# Patient Record
Sex: MALE | Race: WHITE | NOT HISPANIC OR LATINO | Employment: OTHER | URBAN - METROPOLITAN AREA
[De-identification: names, ages, dates, MRNs, and addresses within clinical notes are randomized per-mention and may not be internally consistent; named-entity substitution may affect disease eponyms.]

---

## 2024-07-14 ENCOUNTER — HOSPITAL ENCOUNTER (EMERGENCY)
Facility: HOSPITAL | Age: 69
Discharge: HOME | End: 2024-07-14
Attending: EMERGENCY MEDICINE
Payer: MEDICARE

## 2024-07-14 VITALS
OXYGEN SATURATION: 96 % | DIASTOLIC BLOOD PRESSURE: 93 MMHG | WEIGHT: 165 LBS | HEART RATE: 80 BPM | RESPIRATION RATE: 19 BRPM | TEMPERATURE: 98 F | BODY MASS INDEX: 23.1 KG/M2 | HEIGHT: 71 IN | SYSTOLIC BLOOD PRESSURE: 178 MMHG
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
## 2024-07-14 DIAGNOSIS — I10 CHRONIC HYPERTENSION: ICD-10-CM

## 2024-07-14 DIAGNOSIS — S61.250A OPEN WOUND OF RIGHT INDEX FINGER DUE TO DOG BITE: Primary | ICD-10-CM

## 2024-07-14 DIAGNOSIS — S61.452D DOG BITE OF LEFT HAND, SUBSEQUENT ENCOUNTER: ICD-10-CM

## 2024-07-14 LAB
ALBUMIN SERPL-MCNC: 4 G/DL (ref 3.4–4.8)
ALBUMIN/GLOB SERPL: 1.7 RATIO (ref 1.1–2)
ALP SERPL-CCNC: 75 UNIT/L (ref 40–150)
ALT SERPL-CCNC: 13 UNIT/L (ref 0–55)
ANION GAP SERPL CALC-SCNC: 11 MEQ/L
AST SERPL-CCNC: 17 UNIT/L (ref 5–34)
BASOPHILS # BLD AUTO: 0.03 X10(3)/MCL
BASOPHILS NFR BLD AUTO: 0.5 %
BILIRUB SERPL-MCNC: 1.4 MG/DL
BUN SERPL-MCNC: 18.8 MG/DL (ref 8.4–25.7)
CALCIUM SERPL-MCNC: 9.4 MG/DL (ref 8.8–10)
CHLORIDE SERPL-SCNC: 108 MMOL/L (ref 98–107)
CO2 SERPL-SCNC: 23 MMOL/L (ref 23–31)
CREAT SERPL-MCNC: 0.84 MG/DL (ref 0.73–1.18)
CREAT/UREA NIT SERPL: 22
EOSINOPHIL # BLD AUTO: 0.02 X10(3)/MCL (ref 0–0.9)
EOSINOPHIL NFR BLD AUTO: 0.3 %
ERYTHROCYTE [DISTWIDTH] IN BLOOD BY AUTOMATED COUNT: 12.6 % (ref 11.5–17)
GFR SERPLBLD CREATININE-BSD FMLA CKD-EPI: >60 ML/MIN/1.73/M2
GLOBULIN SER-MCNC: 2.4 GM/DL (ref 2.4–3.5)
GLUCOSE SERPL-MCNC: 85 MG/DL (ref 82–115)
HCT VFR BLD AUTO: 43.5 % (ref 42–52)
HGB BLD-MCNC: 14.3 G/DL (ref 14–18)
IMM GRANULOCYTES # BLD AUTO: 0.01 X10(3)/MCL (ref 0–0.04)
IMM GRANULOCYTES NFR BLD AUTO: 0.2 %
LYMPHOCYTES # BLD AUTO: 1.29 X10(3)/MCL (ref 0.6–4.6)
LYMPHOCYTES NFR BLD AUTO: 20.5 %
MCH RBC QN AUTO: 31.2 PG (ref 27–31)
MCHC RBC AUTO-ENTMCNC: 32.9 G/DL (ref 33–36)
MCV RBC AUTO: 94.8 FL (ref 80–94)
MONOCYTES # BLD AUTO: 0.63 X10(3)/MCL (ref 0.1–1.3)
MONOCYTES NFR BLD AUTO: 10 %
NEUTROPHILS # BLD AUTO: 4.3 X10(3)/MCL (ref 2.1–9.2)
NEUTROPHILS NFR BLD AUTO: 68.5 %
NRBC BLD AUTO-RTO: 0 %
PLATELET # BLD AUTO: 281 X10(3)/MCL (ref 130–400)
PMV BLD AUTO: 9.2 FL (ref 7.4–10.4)
POTASSIUM SERPL-SCNC: 4 MMOL/L (ref 3.5–5.1)
PROT SERPL-MCNC: 6.4 GM/DL (ref 5.8–7.6)
RBC # BLD AUTO: 4.59 X10(6)/MCL (ref 4.7–6.1)
SODIUM SERPL-SCNC: 142 MMOL/L (ref 136–145)
WBC # BLD AUTO: 6.28 X10(3)/MCL (ref 4.5–11.5)

## 2024-07-14 PROCEDURE — 96365 THER/PROPH/DIAG IV INF INIT: CPT

## 2024-07-14 PROCEDURE — 80053 COMPREHEN METABOLIC PANEL: CPT | Performed by: PHYSICIAN ASSISTANT

## 2024-07-14 PROCEDURE — 85025 COMPLETE CBC W/AUTO DIFF WBC: CPT | Performed by: PHYSICIAN ASSISTANT

## 2024-07-14 PROCEDURE — 99284 EMERGENCY DEPT VISIT MOD MDM: CPT | Mod: 25

## 2024-07-14 PROCEDURE — 63600175 PHARM REV CODE 636 W HCPCS: Performed by: PHYSICIAN ASSISTANT

## 2024-07-14 PROCEDURE — 87040 BLOOD CULTURE FOR BACTERIA: CPT | Performed by: PHYSICIAN ASSISTANT

## 2024-07-14 RX ORDER — AMOXICILLIN AND CLAVULANATE POTASSIUM 875; 125 MG/1; MG/1
1 TABLET, FILM COATED ORAL 2 TIMES DAILY
Qty: 20 TABLET | Refills: 0 | Status: SHIPPED | OUTPATIENT
Start: 2024-07-14 | End: 2024-07-24

## 2024-07-14 RX ORDER — IBUPROFEN 600 MG/1
600 TABLET ORAL EVERY 8 HOURS PRN
Qty: 30 TABLET | Refills: 0 | Status: SHIPPED | OUTPATIENT
Start: 2024-07-14 | End: 2024-07-24

## 2024-07-14 RX ORDER — HYDROCODONE BITARTRATE AND ACETAMINOPHEN 5; 325 MG/1; MG/1
1 TABLET ORAL EVERY 6 HOURS PRN
Qty: 12 TABLET | Refills: 0 | Status: SHIPPED | OUTPATIENT
Start: 2024-07-14 | End: 2024-07-19

## 2024-07-14 RX ORDER — CEFAZOLIN SODIUM 2 G/50ML
2 SOLUTION INTRAVENOUS
Status: COMPLETED | OUTPATIENT
Start: 2024-07-14 | End: 2024-07-14

## 2024-07-14 RX ORDER — BACITRACIN ZINC 500 UNIT/G
OINTMENT (GRAM) TOPICAL 2 TIMES DAILY
Qty: 30 G | Refills: 0 | Status: SHIPPED | OUTPATIENT
Start: 2024-07-14 | End: 2024-07-21

## 2024-07-14 RX ADMIN — CEFAZOLIN SODIUM 2 G: 2 SOLUTION INTRAVENOUS at 12:07

## 2024-07-14 NOTE — FIRST PROVIDER EVALUATION
Medical screening examination initiated.  I have conducted a focused provider triage encounter, findings are as follows:    Brief history of present illness:  68yoM with right hand pain after dog bite yesterday afternoon. Evaluated and treated at local ER and discharged. Updated tetanus and given abx. No fracture noted. No ligament/tendon damage.     There were no vitals filed for this visit.    Pertinent physical exam:  NAD. Ambulatory.     Brief workup plan:  labs, imagnig    Preliminary workup initiated; this workup will be continued and followed by the physician or advanced practice provider that is assigned to the patient when roomed.  
done

## 2024-07-14 NOTE — ED PROVIDER NOTES
"Encounter Date: 7/14/2024       History     Chief Complaint   Patient presents with    Animal Bite     Dog bite to LUIS morales that occurred yesterday, was seen @ [place] and left bc the daughter states "they weren't giving him his BP meds, so we went home to cool down and then figure out where we can see a hand surgeon". Received an ABX and updated Tdap, unsure as to what abx he received, has no imaging or documents. Full ROM in joints, bleeding controlled (see media tab for updated wound image). Redressed in triage.      68-year-old male presents to the emergency department with dog bites of the bilateral hand sustained yesterday afternoon.  Seen at another facility; however, left prior to completion of treatment/transfer as states was getting frustrated.  Reports tetanus immunization was updated and he was given a dose of IM antibiotics as well as a single tablet of Norco p.o. denies any additional injuries since that time.  It was his dog who bit him, states that it was provoked that he was putting his hand near the dog's food/treat.  States dog up-to-date on immunization    The history is provided by the patient.     Review of patient's allergies indicates:  No Known Allergies  No past medical history on file.  No past surgical history on file.  No family history on file.     Review of Systems   Constitutional:  Negative for fever.   Skin:  Positive for wound.       Physical Exam     Initial Vitals [07/14/24 1146]   BP Pulse Resp Temp SpO2   (!) 178/93 80 19 97.6 °F (36.4 °C) 96 %      MAP       --         Physical Exam    Nursing note and vitals reviewed.  Constitutional: He appears well-developed and well-nourished. No distress.   HENT:   Head: Normocephalic and atraumatic.   Mouth/Throat: Oropharynx is clear and moist.   Neck: Neck supple.   Normal range of motion.  Cardiovascular:  Normal rate and regular rhythm.           2+ radial pulses bilaterally   Pulmonary/Chest: No respiratory distress. "   Musculoskeletal:      Cervical back: Normal range of motion and neck supple.      Comments: Range of motion of the fingers limited flexion bilaterally; however, states this is chronic due to arthropathy, no gross deformity, no obvious bone, ligament, or tendinous exposure     Neurological: He is alert and oriented to person, place, and time. No sensory deficit.   Skin: Skin is warm and dry. Capillary refill takes less than 2 seconds.   Wounds as imaged below.  Stellate laceration overlying the left proximal phalanx of the index finger   Soft tissue avulsion over the right proximal index finger, no active bleeding               ED Course   Procedures  Labs Reviewed   COMPREHENSIVE METABOLIC PANEL - Abnormal; Notable for the following components:       Result Value    Chloride 108 (*)     All other components within normal limits   CBC WITH DIFFERENTIAL - Abnormal; Notable for the following components:    RBC 4.59 (*)     MCV 94.8 (*)     MCH 31.2 (*)     MCHC 32.9 (*)     All other components within normal limits   BLOOD CULTURE [facility]   BLOOD CULTURE [facility]   CBC W/ AUTO DIFFERENTIAL    Narrative:     The following orders were created for panel order CBC auto differential.  Procedure                               Abnormality         Status                     ---------                               -----------         ------                     CBC with Differential[[id number]]       Abnormal            Final result                 Please view results for these tests on the individual orders.          Imaging Results              X-Ray Hand 3 view Left (Final result)  Result time 07/14/24 13:25:05      Final result by Foster Reich MD (07/14/24 13:25:05)                   Impression:      No acute osseous abnormality identified.      Electronically signed by: Foster Reich  Date:    07/14/2024  Time:    13:25               Narrative:    EXAMINATION:  XR HAND COMPLETE 3 VIEW LEFT    CLINICAL HISTORY:  left hand  injury;    TECHNIQUE:  Three views.    COMPARISON:  Right knee radiographs same date.    FINDINGS:  Degenerative arthritic changes involve the 1st carpometacarpal articulation.  Articular surface alignment is preserved.  No acute fracture or dislocation identified.                                       X-Ray Hand 3 view Right (Final result)  Result time 07/14/24 13:23:11      Final result by Foster Reich MD (07/14/24 13:23:11)                   Impression:      No acute osseous abnormality identified.      Electronically signed by: Foster Reich  Date:    07/14/2024  Time:    13:23               Narrative:    EXAMINATION:  XR HAND COMPLETE 3 VIEW RIGHT    CLINICAL HISTORY:  right hand injury;    TECHNIQUE:  Three views    COMPARISON:  Left hand radiograph same date.    FINDINGS:  Articular and osseous structures are unremarkable.  No acute fracture, dislocation or arthritic change.  Alignment and position is unremarkable.  No soft tissue radiopaque foreign body identified.                                       Medications   cefazolin (ANCEF) 2 gram in dextrose 5% 50 mL IVPB (premix) (0 g Intravenous Stopped 7/14/24 1300)     Medical Decision Making  Problems Addressed:  Chronic hypertension: chronic illness or injury with exacerbation, progression, or side effects of treatment  Dog bite of left hand, subsequent encounter: acute illness or injury  Open wound of right index finger due to dog bite: acute illness or injury    Risk  OTC drugs.  Prescription drug management.      ED assessment:    Mr. Hawley Presented for evaluation after sustaining multiple dog bites yesterday.  Hemodynamically stable and wounds hemostatic at this time.  No new neurovascular deficits appreciated on examination.  X-ray obtained demonstrates no bony involvement.  IV antibiotics given x1.  Discussed with plastic surgery on-call who agrees to see the patient in clinic follow up on Wednesday.  Referral has been submitted.  Contact  information provided to the patient to assist in establishing care as well.  Will provide topical antibiotics for the left index finger wound, advised wet-to-dry dressings to the right index finger wound as instructed by Plastic surgery.  Oral antibiotic course along with short course of oral analgesics provided as well. ED return precautions reviewed at the bedside and provided in the written discharge instructions. All questions answered to the best of my ability.     Differential diagnosis (including but not limited to):   Dog bite, soft tissue avulsion, finger fracture      My independent radiology interpretation:   XR R hand:  No acute fracture or subluxation, no radiopaque foreign body  XR L hand:  No acute fracture or subluxation, no radiopaque foreign body    Amount and/or Complexity of Data Reviewed  Independent historian: none   Summary of history:   External data reviewed: no prior records available for review   Summary of data reviewed:   Risk and benefits of testing: discussed   Labs: ordered and reviewed  Radiology: ordered and independent interpretation performed (see above or ED course)    Discussion of management or test interpretation with external provider(s): discussed with plastic surgery consultant   Summary of discussion: as below    Risk  Prescription drug management   Shared decision making     Critical Care  none    I, Klarissa Eduardo MD personally performed the history, PE, MDM, and procedures as documented above and agree with the scribe's documentation.              ED Course as of 07/14/24 1338   Sun Jul 14, 2024   1230 Reviewed wound images with plastic surgery on-call, Dr. Dodd, advised wet to dry dressings, will see patient in clinic on Wednesday. [KS]      ED Course User Index  [KS] Klarissa Eduardo MD                           Clinical Impression:  Final diagnoses:  [S61.250A, W54.0XXA] Open wound of right index finger due to dog bite (Primary)  [S61.452D, W54.0XXD] Dog bite of  left hand, subsequent encounter  [I10] Chronic hypertension          ED Disposition Condition    Discharge Stable          ED Prescriptions       Medication Sig Dispense Start Date End Date Auth. Provider    amoxicillin-clavulanate 875-125mg (AUGMENTIN) 875-125 mg per tablet Take 1 tablet by mouth 2 (two) times daily. for 10 days 20 tablet 7/14/2024 7/24/2024 Klarissa Eduardo MD    HYDROcodone-acetaminophen (NORCO) 5-325 mg per tablet Take 1 tablet by mouth every 6 (six) hours as needed for Pain. 12 tablet 7/14/2024 7/19/2024 Klarissa Eduardo MD    ibuprofen (ADVIL,MOTRIN) 600 MG tablet Take 1 tablet (600 mg total) by mouth every 8 (eight) hours as needed for Pain. 30 tablet 7/14/2024 7/24/2024 Klarissa Eduardo MD    bacitracin 500 unit/gram Oint Apply topically 2 (two) times daily. for 7 days 30 g 7/14/2024 7/21/2024 Klarissa Eduardo MD          Follow-up Information       Follow up With Specialties Details Why Contact Info    [facility] - Emergency Dept Emergency Medicine  As needed, If symptoms worsen [address] [facility] [id number] [phone]    Karlo Dodd MD Plastic Surgery Schedule an appointment as soon as possible for a visit on 7/17/2024 Call the office tomorrow, 07/15/2024, to obtain an appointment time for this Wednesday, July 17, 2024. [facility] Saurabh Mae  [address] [address]  [phone]               Klarissa Eduardo MD  07/14/24 3369

## 2024-07-19 LAB
BACTERIA BLD CULT: NORMAL
BACTERIA BLD CULT: NORMAL